# Patient Record
Sex: FEMALE | Race: BLACK OR AFRICAN AMERICAN | NOT HISPANIC OR LATINO | URBAN - METROPOLITAN AREA
[De-identification: names, ages, dates, MRNs, and addresses within clinical notes are randomized per-mention and may not be internally consistent; named-entity substitution may affect disease eponyms.]

---

## 2024-10-09 ENCOUNTER — OFFICE VISIT (OUTPATIENT)
Dept: URBAN - METROPOLITAN AREA CLINIC 109 | Facility: CLINIC | Age: 59
End: 2024-10-09
Payer: COMMERCIAL

## 2024-10-09 VITALS
TEMPERATURE: 97.1 F | HEIGHT: 65 IN | SYSTOLIC BLOOD PRESSURE: 134 MMHG | BODY MASS INDEX: 34.69 KG/M2 | WEIGHT: 208.2 LBS | HEART RATE: 87 BPM | DIASTOLIC BLOOD PRESSURE: 86 MMHG

## 2024-10-09 DIAGNOSIS — K51.30 ULCERATIVE RECTOSIGMOIDITIS WITHOUT COMPLICATION: ICD-10-CM

## 2024-10-09 DIAGNOSIS — R11.0 CHRONIC NAUSEA: ICD-10-CM

## 2024-10-09 DIAGNOSIS — Z86.73 HISTORY OF STROKE: ICD-10-CM

## 2024-10-09 DIAGNOSIS — R19.4 CHANGE IN BOWEL HABITS: ICD-10-CM

## 2024-10-09 DIAGNOSIS — K21.9 GASTROESOPHAGEAL REFLUX DISEASE, UNSPECIFIED WHETHER ESOPHAGITIS PRESENT: ICD-10-CM

## 2024-10-09 PROBLEM — 88111009: Status: ACTIVE | Noted: 2024-10-09

## 2024-10-09 PROCEDURE — 99214 OFFICE O/P EST MOD 30 MIN: CPT | Performed by: INTERNAL MEDICINE

## 2024-10-09 RX ORDER — OLMESARTAN MEDOXOMIL AND HYDROCHLOROTHIAZIDE 20; 12.5 MG/1; MG/1
1 TABLET TABLET ORAL ONCE A DAY
Status: ACTIVE | COMMUNITY

## 2024-10-09 RX ORDER — MESALAMINE 1.2 G/1
TAKE 2 TABLETS BY MOUTH EVERY DAY TABLET, DELAYED RELEASE ORAL
Qty: 180 TABLET | Refills: 0 | Status: ACTIVE | COMMUNITY

## 2024-10-09 RX ORDER — MESALAMINE 4 G/60ML
AS DIRECTED SUSPENSION RECTAL NIGHTLY
Qty: 30 | Refills: 0 | OUTPATIENT
Start: 2024-10-09 | End: 2024-11-07

## 2024-10-09 RX ORDER — PANTOPRAZOLE SODIUM 40 MG/1
1 TABLET TABLET, DELAYED RELEASE ORAL ONCE A DAY
Qty: 90 TABLET | Refills: 1 | Status: ACTIVE | COMMUNITY
Start: 2023-08-01

## 2024-10-09 RX ORDER — TRAZODONE HYDROCHLORIDE 50 MG/1
1 TABLET AT BEDTIME AS NEEDED TABLET ORAL ONCE A DAY
Status: ACTIVE | COMMUNITY

## 2024-10-09 RX ORDER — ONDANSETRON HYDROCHLORIDE 4 MG/1
1 TABLET TABLET, FILM COATED ORAL
Qty: 30 | Refills: 0 | OUTPATIENT
Start: 2024-10-09

## 2024-10-09 RX ORDER — OLANZAPINE 10 MG/1
1 TABLET TABLET, FILM COATED ORAL ONCE A DAY
Status: ACTIVE | COMMUNITY

## 2024-10-09 RX ORDER — DULOXETINE 60 MG/1
1 CAPSULE CAPSULE, DELAYED RELEASE PELLETS ORAL ONCE A DAY
Status: ACTIVE | COMMUNITY

## 2024-10-23 ENCOUNTER — OFFICE VISIT (OUTPATIENT)
Dept: URBAN - METROPOLITAN AREA CLINIC 109 | Facility: CLINIC | Age: 59
End: 2024-10-23

## 2024-12-11 ENCOUNTER — OFFICE VISIT (OUTPATIENT)
Dept: URBAN - METROPOLITAN AREA CLINIC 109 | Facility: CLINIC | Age: 59
End: 2024-12-11
Payer: COMMERCIAL

## 2024-12-11 VITALS
DIASTOLIC BLOOD PRESSURE: 99 MMHG | TEMPERATURE: 97.5 F | BODY MASS INDEX: 33.55 KG/M2 | HEART RATE: 101 BPM | SYSTOLIC BLOOD PRESSURE: 155 MMHG | HEIGHT: 65 IN | WEIGHT: 201.4 LBS

## 2024-12-11 DIAGNOSIS — Z86.73 HISTORY OF STROKE: ICD-10-CM

## 2024-12-11 DIAGNOSIS — R19.4 CHANGE IN BOWEL HABITS: ICD-10-CM

## 2024-12-11 DIAGNOSIS — R11.0 CHRONIC NAUSEA: ICD-10-CM

## 2024-12-11 DIAGNOSIS — K21.9 GASTROESOPHAGEAL REFLUX DISEASE, UNSPECIFIED WHETHER ESOPHAGITIS PRESENT: ICD-10-CM

## 2024-12-11 DIAGNOSIS — K51.30 ULCERATIVE RECTOSIGMOIDITIS WITHOUT COMPLICATION: ICD-10-CM

## 2024-12-11 PROCEDURE — 99214 OFFICE O/P EST MOD 30 MIN: CPT | Performed by: INTERNAL MEDICINE

## 2024-12-11 RX ORDER — PANTOPRAZOLE SODIUM 40 MG/1
1 TABLET TABLET, DELAYED RELEASE ORAL ONCE A DAY
Qty: 90 TABLET | Refills: 1 | Status: ACTIVE | COMMUNITY
Start: 2023-08-01

## 2024-12-11 RX ORDER — OLMESARTAN MEDOXOMIL AND HYDROCHLOROTHIAZIDE 20; 12.5 MG/1; MG/1
1 TABLET TABLET ORAL ONCE A DAY
Status: ACTIVE | COMMUNITY

## 2024-12-11 RX ORDER — TRAZODONE HYDROCHLORIDE 50 MG/1
1 TABLET AT BEDTIME AS NEEDED TABLET ORAL ONCE A DAY
Status: ON HOLD | COMMUNITY

## 2024-12-11 RX ORDER — OLANZAPINE 10 MG/1
1 TABLET TABLET, FILM COATED ORAL ONCE A DAY
Status: ACTIVE | COMMUNITY

## 2024-12-11 RX ORDER — MESALAMINE 1.2 G/1
2 TABLETS WITH A MEAL TABLET, DELAYED RELEASE ORAL ONCE A DAY
Qty: 180 TABLET | Refills: 3

## 2024-12-11 RX ORDER — DULOXETINE 60 MG/1
1 CAPSULE CAPSULE, DELAYED RELEASE PELLETS ORAL ONCE A DAY
Status: ACTIVE | COMMUNITY

## 2024-12-11 RX ORDER — MESALAMINE 1.2 G/1
TAKE 2 TABLETS BY MOUTH EVERY DAY TABLET, DELAYED RELEASE ORAL
Qty: 180 TABLET | Refills: 0 | Status: ACTIVE | COMMUNITY

## 2024-12-11 RX ORDER — ONDANSETRON HYDROCHLORIDE 4 MG/1
1 TABLET TABLET, FILM COATED ORAL
Qty: 30 | Refills: 0 | Status: ACTIVE | COMMUNITY
Start: 2024-10-09

## 2024-12-11 RX ORDER — ONDANSETRON HYDROCHLORIDE 4 MG/1
1 TABLET TABLET, FILM COATED ORAL
Qty: 30 | Refills: 0 | OUTPATIENT

## 2024-12-11 NOTE — HPI-TODAY'S VISIT:
The patient presents for follow-up appointment.  her bowel habit changes/diarrhea improved with mesalamine enemas.  on po mesalamine now.  has had some constipation issues lately but denies abd pain or hematochezia.

## 2025-01-27 ENCOUNTER — ERX REFILL RESPONSE (OUTPATIENT)
Dept: URBAN - METROPOLITAN AREA CLINIC 109 | Facility: CLINIC | Age: 60
End: 2025-01-27

## 2025-01-27 RX ORDER — ONDANSETRON HYDROCHLORIDE 4 MG/1
1 TABLET TABLET, FILM COATED ORAL
Qty: 30 | Refills: 0 | OUTPATIENT

## 2025-06-11 ENCOUNTER — OFFICE VISIT (OUTPATIENT)
Dept: URBAN - METROPOLITAN AREA CLINIC 109 | Facility: CLINIC | Age: 60
End: 2025-06-11
Payer: COMMERCIAL

## 2025-06-11 DIAGNOSIS — K21.9 GASTROESOPHAGEAL REFLUX DISEASE, UNSPECIFIED WHETHER ESOPHAGITIS PRESENT: ICD-10-CM

## 2025-06-11 DIAGNOSIS — R11.0 CHRONIC NAUSEA: ICD-10-CM

## 2025-06-11 DIAGNOSIS — Z86.73 HISTORY OF STROKE: ICD-10-CM

## 2025-06-11 DIAGNOSIS — K51.30 ULCERATIVE RECTOSIGMOIDITIS WITHOUT COMPLICATION: ICD-10-CM

## 2025-06-11 PROCEDURE — 99214 OFFICE O/P EST MOD 30 MIN: CPT | Performed by: INTERNAL MEDICINE

## 2025-06-11 RX ORDER — DULOXETINE 60 MG/1
1 CAPSULE CAPSULE, DELAYED RELEASE PELLETS ORAL ONCE A DAY
Status: ACTIVE | COMMUNITY

## 2025-06-11 RX ORDER — TRAZODONE HYDROCHLORIDE 50 MG/1
1 TABLET AT BEDTIME AS NEEDED TABLET ORAL ONCE A DAY
Status: ON HOLD | COMMUNITY

## 2025-06-11 RX ORDER — PANTOPRAZOLE SODIUM 40 MG/1
1 TABLET TABLET, DELAYED RELEASE ORAL ONCE A DAY
Qty: 90 TABLET | Refills: 1 | Status: ACTIVE | COMMUNITY
Start: 2023-08-01

## 2025-06-11 RX ORDER — OLMESARTAN MEDOXOMIL AND HYDROCHLOROTHIAZIDE 20; 12.5 MG/1; MG/1
1 TABLET TABLET ORAL ONCE A DAY
Status: ACTIVE | COMMUNITY

## 2025-06-11 RX ORDER — OLANZAPINE 5 MG/1
1 TABLET TABLET ORAL ONCE A DAY
Status: ACTIVE | COMMUNITY

## 2025-06-11 RX ORDER — MESALAMINE 1.2 G/1
2 TABLETS WITH A MEAL TABLET, DELAYED RELEASE ORAL ONCE A DAY
Qty: 180 TABLET | Refills: 3 | Status: ACTIVE | COMMUNITY

## 2025-06-11 RX ORDER — MESALAMINE 1.2 G/1
2 TABLETS WITH A MEAL TABLET, DELAYED RELEASE ORAL ONCE A DAY
Qty: 180 TABLET | Refills: 3

## 2025-06-11 RX ORDER — ONDANSETRON HYDROCHLORIDE 4 MG/1
1 TABLET TABLET, FILM COATED ORAL
Qty: 30 | Refills: 0 | Status: ON HOLD | COMMUNITY

## 2025-06-11 RX ORDER — ONDANSETRON HYDROCHLORIDE 4 MG/1
1 TABLET TABLET, FILM COATED ORAL
Qty: 30 | Refills: 3 | OUTPATIENT
Start: 2025-06-11

## 2025-06-11 NOTE — HPI-TODAY'S VISIT:
The patient presents for f/u appt.  doing well overall regarding UC.  h/o left sided UC, on mesalamine, denies hematochezia, abd pain or bowel habit changes.  has occasional constipation.  has chronic nausea without vomiting.  denies pain with po intake.  takes prn zofran which helps.  last colonoscopy in 2024 with signs of mild left sided colitis.

## 2025-06-30 ENCOUNTER — TELEPHONE ENCOUNTER (OUTPATIENT)
Dept: URBAN - METROPOLITAN AREA CLINIC 109 | Facility: CLINIC | Age: 60
End: 2025-06-30

## 2025-06-30 RX ORDER — PREDNISONE 10 MG/1
1 TABLET WITH FOOD OR MILK TABLET ORAL
Qty: 21 | Refills: 0 | OUTPATIENT
Start: 2025-06-30